# Patient Record
(demographics unavailable — no encounter records)

---

## 2019-01-01 NOTE — NBADM
[facility] Admission Note


Date of Admission


2019 at 00:25





History


This is a baby boy born at 39 and 1 weeks of gestational age via vaginal 

delivery to a 24-year-old  (G) 2 para (P) 0 -1 -0-1 mother who is blood 

type B positive, hepatitis B negative, rapid plasma reagin (RPR) negative, HIV 

negative, group B Streptococcus negative. Baby cried at birth. Apgar scores were

9 at one minute and and 9 at five minutes. Baby was admitted to the Mother-Baby 

unit.





Physical Examination


Physical Measurements


On admission, the baby's weight is 2740 grams, length is 46 cm, and head 

circumference is 34 cm.


Vital Signs





Vital Signs








  Date Time  Temp Pulse Resp B/P (MAP) Pulse Ox O2 Delivery O2 Flow Rate FiO2


 


19 01:42 98.4 158 50 67/31 (43)    








General:  Positive: Active; 


   Negative: Respiratory Distress, Dysmorphic Features


HEENT:  Positive: Normocephalic, Anterior [place] Open, Positive Red Reflexes

Zeke, Nares Patent, Ears Well Formed, Ears Well Set; 


   Negative: Cleft Lip, Cleft Palate


Heart:  Positive: S1,S2; 


   Negative: Murmur


Lungs:  Positive: Good Bilateral Air Entry; 


   Negative: Grunting and Retractions, Tachypnea


Abdomen:  Positive: Soft, Bowel sounds Present; 


   Negative: Distended


Male Genitalia:  Positive: Nl Term Male Genitalia


Anus:  Positive: Patent


Extremities:  Positive: Full ROM Times 4, Femoral Pulses; 


   Negative: Hip Click


Skin:  Positive: Normal for Gestation, Normal Capillary Refill


Neurological:  POSITIVE: Good Tone, Positive Todd Reflex, Positive Suck Reflex, 

Positive Grasp Reflex





Asessment


Problems:  


(1) Liveborn infant by vaginal delivery





Plan


1. Admit to mother-baby unit.


2. Routine  care.


3. Parents updated on condition and plan for the baby.











PINKY GOODMAN DO                 2019 11:04

## 2019-01-01 NOTE — RO
DATE OF PROCEDURE:  2019

 

PREOPERATIVE DIAGNOSIS:  Circumcision.

 

POSTOPERATIVE DIAGNOSIS:  Circumcision.

 

OPERATION PROPOSED:  Circumcision.

 

OPERATION PERFORMED:  Circumcision.

 

SURGEON:  Dr. Alexander Lyn

 

ASSISTANT:

 

ANESTHESIA:  Penile block, 1% Xylocaine 0.8 mL

 

Circumcision with a 1.3 Gomco bell.

 

DESCRIPTION OF PROCEDURE:  After adequate time-out, penile block, 1% Xylocaine

0.8 mL, circumcision was performed with a 1.3 Gomco bell.  Hemostasis was

secured.  Vaseline was applied to penis and diaper, and the patient was taken

back to the mother with discharge instructions.

## 2019-01-01 NOTE — DS.PDOC
[facility] Discharge Summary


General


Date of Birth


19


Date of Discharge


2019





Problem List


Problems:  


(1) Liveborn infant by vaginal delivery





Procedures During Visit


Circumcision, Hearing screen and BiliChek were performed.





History


This is a baby boy born at 39 and 1 weeks of gestational age via vaginal 

delivery to a 24-year-old  (G) 2 para (P) 0 -1 -0-1 mother who is blood 

type B positive, hepatitis B negative, rapid plasma reagin (RPR) negative, HIV 

negative, group B Streptococcus negative. Baby cried at birth. Apgar scores were

9 at one minute and and 9 at five minutes. Baby was admitted to the Mother-Baby 

unit.





Exam on Admission to Nursery


Measurements on Admission


On admission, the baby's weight is 2740 grams, length is 46 cm, and head 

circumference is 34 cm.


General:  Positive: Active; 


   Negative: Respiratory Distress, Dysmorphic Features


HEENT:  Positive: Normocephalic, Anterior [place] Open, Positive Red Reflexes

Zeke, Nares Patent, Ears Well Formed, Ears Well Set


Heart:  Positive: S1,S2; 


   Negative: Murmur


Lungs:  Positive: Good Bilateral Air Entry; 


   Negative: Grunting and Retractions, Tachypnea


Abdomen:  Positive: Soft, Bowel sounds Present


Male Genitalia:  Positive: Nl Term Male Genitalia


Anus:  Positive: Patent


Extremities:  Positive: Full ROM Times 4, Femoral Pulses


Skin:  Positive: Normal for Gestation, Normal Capillary Refill


Neurological:  POSITIVE: Good Tone, Positive Till[place] Reflex, Positive Suck Reflex, 

Positive Grasp Reflex





Summary Text


On the day of discharge, the baby's weight is 2600 grams and the baby is breast 

feeding well ad les.


Physical Examination was within normal limits and circumcision is healing well, 

continue to apply Vaseline as directed.


The baby passed a hearing screen, received the first dose of hepatitis B vaccine

on 2019.  Bilirubin check is 4.8 at 30 hours of life.


Discharge baby home with mother, followup as scheduled by parents with [facility].











PINKY GOODMAN DO                 2019 11:36